# Patient Record
(demographics unavailable — no encounter records)

---

## 2025-03-14 NOTE — HISTORY OF PRESENT ILLNESS
[de-identified] : 73-year-old male here for evaluation of pain to the right hip, patient states that the pain is started on the right sided lower back radiating to the buttocks.  Sometimes the pain travels to the anterior aspect of the abdomen. Patient denies any trauma or any injury. Patient states that the pain is present when he sits down for prolonged.  Time.

## 2025-03-14 NOTE — DISCUSSION/SUMMARY
[de-identified] : Impression: Right hip pain due to lumbar radiculopathy  Plan: Patient was advised to physical therapy, patient was advised that if the pain does not improve to be evaluated by Dr. Aguillon who saw him On the past for his lower back.  Follow-up: 2 to 3 months with Dr. Aguillon if needed.